# Patient Record
Sex: MALE | Race: WHITE | NOT HISPANIC OR LATINO | Employment: FULL TIME | URBAN - METROPOLITAN AREA
[De-identification: names, ages, dates, MRNs, and addresses within clinical notes are randomized per-mention and may not be internally consistent; named-entity substitution may affect disease eponyms.]

---

## 2018-11-30 DIAGNOSIS — Z00.00 ANNUAL PHYSICAL EXAM: Primary | ICD-10-CM

## 2018-11-30 DIAGNOSIS — Z13.220 NEED FOR LIPID SCREENING: ICD-10-CM

## 2018-12-06 ENCOUNTER — TELEPHONE (OUTPATIENT)
Dept: FAMILY MEDICINE CLINIC | Facility: CLINIC | Age: 38
End: 2018-12-06

## 2018-12-06 LAB
ALBUMIN SERPL-MCNC: 4.7 G/DL (ref 3.6–5.1)
ALBUMIN/GLOB SERPL: 1.7 (CALC) (ref 1–2.5)
ALP SERPL-CCNC: 58 U/L (ref 40–115)
ALT SERPL-CCNC: 14 U/L (ref 9–46)
AST SERPL-CCNC: 17 U/L (ref 10–40)
BILIRUB SERPL-MCNC: 1 MG/DL (ref 0.2–1.2)
BUN SERPL-MCNC: 16 MG/DL (ref 7–25)
BUN/CREAT SERPL: NORMAL (CALC) (ref 6–22)
CALCIUM SERPL-MCNC: 9.5 MG/DL (ref 8.6–10.3)
CHLORIDE SERPL-SCNC: 104 MMOL/L (ref 98–110)
CHOLEST SERPL-MCNC: 161 MG/DL
CHOLEST/HDLC SERPL: 3.8 (CALC)
CO2 SERPL-SCNC: 28 MMOL/L (ref 20–32)
CREAT SERPL-MCNC: 1.01 MG/DL (ref 0.6–1.35)
GLOBULIN SER CALC-MCNC: 2.8 G/DL (CALC) (ref 1.9–3.7)
GLUCOSE SERPL-MCNC: 93 MG/DL (ref 65–99)
HDLC SERPL-MCNC: 42 MG/DL
LDLC SERPL CALC-MCNC: 104 MG/DL (CALC)
NONHDLC SERPL-MCNC: 119 MG/DL (CALC)
POTASSIUM SERPL-SCNC: 4 MMOL/L (ref 3.5–5.3)
PROT SERPL-MCNC: 7.5 G/DL (ref 6.1–8.1)
SL AMB EGFR AFRICAN AMERICAN: 110 ML/MIN/1.73M2
SL AMB EGFR NON AFRICAN AMERICAN: 95 ML/MIN/1.73M2
SODIUM SERPL-SCNC: 138 MMOL/L (ref 135–146)
TRIGL SERPL-MCNC: 67 MG/DL

## 2018-12-06 NOTE — TELEPHONE ENCOUNTER
----- Message from Yanna Robison MD sent at 12/6/2018  6:47 AM EST -----  MM/LABS 12MOS    APPOINTMENT 12/11/18 @ [phone] ([id number]) w/Dr Karyle Reasoner

## 2018-12-11 ENCOUNTER — OFFICE VISIT (OUTPATIENT)
Dept: FAMILY MEDICINE CLINIC | Facility: CLINIC | Age: 38
End: 2018-12-11
Payer: COMMERCIAL

## 2018-12-11 VITALS
HEIGHT: 74 IN | OXYGEN SATURATION: 98 % | BODY MASS INDEX: 24 KG/M2 | HEART RATE: 73 BPM | DIASTOLIC BLOOD PRESSURE: 70 MMHG | WEIGHT: 187 LBS | SYSTOLIC BLOOD PRESSURE: 126 MMHG

## 2018-12-11 DIAGNOSIS — Z00.00 WELLNESS EXAMINATION: Primary | ICD-10-CM

## 2018-12-11 DIAGNOSIS — Z00.00 ANNUAL PHYSICAL EXAM: ICD-10-CM

## 2018-12-11 PROCEDURE — 99385 PREV VISIT NEW AGE 18-39: CPT | Performed by: FAMILY MEDICINE

## 2018-12-11 NOTE — PATIENT INSTRUCTIONS
F/u 1-3 yrs    Wellness Visit for Adults   AMBULATORY CARE:   A wellness visit  is when you see your healthcare provider to get screened for health problems  You can also get advice on how to stay healthy  Write down your questions so you remember to ask them  Ask your healthcare provider how often you should have a wellness visit  What happens at a wellness visit:  Your healthcare provider will ask about your health, and your family history of health problems  This includes high blood pressure, heart disease, and cancer  He or she will ask if you have symptoms that concern you, if you smoke, and about your mood  You may also be asked about your intake of medicines, supplements, food, and alcohol  Any of the following may be done:  · Your weight  will be checked  Your height may also be checked so your body mass index (BMI) can be calculated  Your BMI shows if you are at a healthy weight  · Your blood pressure  and heart rate will be checked  Your temperature may also be checked  · Blood and urine tests  may be done  Blood tests may be done to check your cholesterol levels  Abnormal cholesterol levels increase your risk for heart disease and stroke  You may also need a blood or urine test to check for diabetes if you are at increased risk  Urine tests may be done to look for signs of an infection or kidney disease  · A physical exam  includes checking your heartbeat and lungs with a stethoscope  Your healthcare provider may also check your skin to look for sun damage  · Screening tests  may be recommended  A screening test is done to check for diseases that may not cause symptoms  The screening tests you may need depend on your age, gender, family history, and lifestyle habits  For example, colorectal screening may be recommended if you are 48years old or older  Screening tests you need if you are a woman:   · A Pap smear  is used to screen for cervical cancer   Pap smears are usually done every 3 to 5 years depending on your age  You may need them more often if you have had abnormal Pap smear test results in the past  Ask your healthcare provider how often you should have a Pap smear  · A mammogram  is an x-ray of your breasts to screen for breast cancer  Experts recommend mammograms every 2 years starting at age 48 years  You may need a mammogram at age 52 years or younger if you have an increased risk for breast cancer  Talk to your healthcare provider about when you should start having mammograms and how often you need them  Vaccines you may need:   · Get an influenza vaccine  every year  The influenza vaccine protects you from the flu  Several types of viruses cause the flu  The viruses change over time, so new vaccines are made each year  · Get a tetanus-diphtheria (Td) booster vaccine  every 10 years  This vaccine protects you against tetanus and diphtheria  Tetanus is a severe infection that may cause painful muscle spasms and lockjaw  Diphtheria is a severe bacterial infection that causes a thick covering in the back of your mouth and throat  · Get a human papillomavirus (HPV) vaccine  if you are female and aged 23 to 32 or male 23 to 24 and never received it  This vaccine protects you from HPV infection  HPV is the most common infection spread by sexual contact  HPV may also cause vaginal, penile, and anal cancers  · Get a pneumococcal vaccine  if you are aged 72 years or older  The pneumococcal vaccine is an injection given to protect you from pneumococcal disease  Pneumococcal disease is an infection caused by pneumococcal bacteria  The infection may cause pneumonia, meningitis, or an ear infection  · Get a shingles vaccine  if you are aged 61 or older, even if you have had shingles before  The shingles vaccine is an injection to protect you from the varicella-zoster virus  This is the same virus that causes chickenpox   Shingles is a painful rash that develops in people who had chickenpox or have been exposed to the virus  How to eat healthy:  My Plate is a model for planning healthy meals  It shows the types and amounts of foods that should go on your plate  Fruits and vegetables make up about half of your plate, and grains and protein make up the other half  A serving of dairy is included on the side of your plate  The amount of calories and serving sizes you need depends on your age, gender, weight, and height  Examples of healthy foods are listed below:  · Eat a variety of vegetables  such as dark green, red, and orange vegetables  You can also include canned vegetables low in sodium (salt) and frozen vegetables without added butter or sauces  · Eat a variety of fresh fruits , canned fruit in 100% juice, frozen fruit, and dried fruit  · Include whole grains  At least half of the grains you eat should be whole grains  Examples include whole-wheat bread, wheat pasta, brown rice, and whole-grain cereals such as oatmeal     · Eat a variety of protein foods such as seafood (fish and shellfish), lean meat, and poultry without skin (turkey and chicken)  Examples of lean meats include pork leg, shoulder, or tenderloin, and beef round, sirloin, tenderloin, and extra lean ground beef  Other protein foods include eggs and egg substitutes, beans, peas, soy products, nuts, and seeds  · Choose low-fat dairy products such as skim or 1% milk or low-fat yogurt, cheese, and cottage cheese  · Limit unhealthy fats  such as butter, hard margarine, and shortening  Exercise:  Exercise at least 30 minutes per day on most days of the week  Some examples of exercise include walking, biking, dancing, and swimming  You can also fit in more physical activity by taking the stairs instead of the elevator or parking farther away from stores  Include muscle strengthening activities 2 days each week  Regular exercise provides many health benefits   It helps you manage your weight, and decreases your risk for type 2 diabetes, heart disease, stroke, and high blood pressure  Exercise can also help improve your mood  Ask your healthcare provider about the best exercise plan for you  General health and safety guidelines:   · Do not smoke  Nicotine and other chemicals in cigarettes and cigars can cause lung damage  Ask your healthcare provider for information if you currently smoke and need help to quit  E-cigarettes or smokeless tobacco still contain nicotine  Talk to your healthcare provider before you use these products  · Limit alcohol  A drink of alcohol is 12 ounces of beer, 5 ounces of wine, or 1½ ounces of liquor  · Lose weight, if needed  Being overweight increases your risk of certain health conditions  These include heart disease, high blood pressure, type 2 diabetes, and certain types of cancer  · Protect your skin  Do not sunbathe or use tanning beds  Use sunscreen with a SPF 15 or higher  Apply sunscreen at least 15 minutes before you go outside  Reapply sunscreen every 2 hours  Wear protective clothing, hats, and sunglasses when you are outside  · Drive safely  Always wear your seatbelt  Make sure everyone in your car wears a seatbelt  A seatbelt can save your life if you are in an accident  Do not use your cell phone when you are driving  This could distract you and cause an accident  Pull over if you need to make a call or send a text message  · Practice safe sex  Use latex condoms if are sexually active and have more than one partner  Your healthcare provider may recommend screening tests for sexually transmitted infections (STIs)  · Wear helmets, lifejackets, and protective gear  Always wear a helmet when you ride a bike or motorcycle, go skiing, or play sports that could cause a head injury  Wear protective equipment when you play sports  Wear a lifejacket when you are on a boat or doing water sports    © 2017 [employer] Information is for End User's use only and may not be sold, redistributed or otherwise used for commercial purposes  All illustrations and images included in CareNotes® are the copyrighted property of [employer]  or [employer]  The above information is an  only  It is not intended as medical advice for individual conditions or treatments  Talk to your doctor, nurse or pharmacist before following any medical regimen to see if it is safe and effective for you  F/u 1-3 yrs    Wellness Visit for Adults   AMBULATORY CARE:   A wellness visit  is when you see your healthcare provider to get screened for health problems  You can also get advice on how to stay healthy  Write down your questions so you remember to ask them  Ask your healthcare provider how often you should have a wellness visit  What happens at a wellness visit:  Your healthcare provider will ask about your health, and your family history of health problems  This includes high blood pressure, heart disease, and cancer  He or she will ask if you have symptoms that concern you, if you smoke, and about your mood  You may also be asked about your intake of medicines, supplements, food, and alcohol  Any of the following may be done:  · Your weight  will be checked  Your height may also be checked so your body mass index (BMI) can be calculated  Your BMI shows if you are at a healthy weight  · Your blood pressure  and heart rate will be checked  Your temperature may also be checked  · Blood and urine tests  may be done  Blood tests may be done to check your cholesterol levels  Abnormal cholesterol levels increase your risk for heart disease and stroke  You may also need a blood or urine test to check for diabetes if you are at increased risk  Urine tests may be done to look for signs of an infection or kidney disease  · A physical exam  includes checking your heartbeat and lungs with a stethoscope   Your healthcare provider may also check your skin to look for sun damage  · Screening tests  may be recommended  A screening test is done to check for diseases that may not cause symptoms  The screening tests you may need depend on your age, gender, family history, and lifestyle habits  For example, colorectal screening may be recommended if you are 48years old or older  Screening tests you need if you are a woman:   · A Pap smear  is used to screen for cervical cancer  Pap smears are usually done every 3 to 5 years depending on your age  You may need them more often if you have had abnormal Pap smear test results in the past  Ask your healthcare provider how often you should have a Pap smear  · A mammogram  is an x-ray of your breasts to screen for breast cancer  Experts recommend mammograms every 2 years starting at age 48 years  You may need a mammogram at age 52 years or younger if you have an increased risk for breast cancer  Talk to your healthcare provider about when you should start having mammograms and how often you need them  Vaccines you may need:   · Get an influenza vaccine  every year  The influenza vaccine protects you from the flu  Several types of viruses cause the flu  The viruses change over time, so new vaccines are made each year  · Get a tetanus-diphtheria (Td) booster vaccine  every 10 years  This vaccine protects you against tetanus and diphtheria  Tetanus is a severe infection that may cause painful muscle spasms and lockjaw  Diphtheria is a severe bacterial infection that causes a thick covering in the back of your mouth and throat  · Get a human papillomavirus (HPV) vaccine  if you are female and aged 23 to 32 or male 23 to 24 and never received it  This vaccine protects you from HPV infection  HPV is the most common infection spread by sexual contact  HPV may also cause vaginal, penile, and anal cancers  · Get a pneumococcal vaccine  if you are aged 72 years or older   The pneumococcal vaccine is an injection given to protect you from pneumococcal disease  Pneumococcal disease is an infection caused by pneumococcal bacteria  The infection may cause pneumonia, meningitis, or an ear infection  · Get a shingles vaccine  if you are aged 61 or older, even if you have had shingles before  The shingles vaccine is an injection to protect you from the varicella-zoster virus  This is the same virus that causes chickenpox  Shingles is a painful rash that develops in people who had chickenpox or have been exposed to the virus  How to eat healthy:  My Plate is a model for planning healthy meals  It shows the types and amounts of foods that should go on your plate  Fruits and vegetables make up about half of your plate, and grains and protein make up the other half  A serving of dairy is included on the side of your plate  The amount of calories and serving sizes you need depends on your age, gender, weight, and height  Examples of healthy foods are listed below:  · Eat a variety of vegetables  such as dark green, red, and orange vegetables  You can also include canned vegetables low in sodium (salt) and frozen vegetables without added butter or sauces  · Eat a variety of fresh fruits , canned fruit in 100% juice, frozen fruit, and dried fruit  · Include whole grains  At least half of the grains you eat should be whole grains  Examples include whole-wheat bread, wheat pasta, brown rice, and whole-grain cereals such as oatmeal     · Eat a variety of protein foods such as seafood (fish and shellfish), lean meat, and poultry without skin (turkey and chicken)  Examples of lean meats include pork leg, shoulder, or tenderloin, and beef round, sirloin, tenderloin, and extra lean ground beef  Other protein foods include eggs and egg substitutes, beans, peas, soy products, nuts, and seeds  · Choose low-fat dairy products such as skim or 1% milk or low-fat yogurt, cheese, and cottage cheese       · Limit unhealthy fats such as butter, hard margarine, and shortening  Exercise:  Exercise at least 30 minutes per day on most days of the week  Some examples of exercise include walking, biking, dancing, and swimming  You can also fit in more physical activity by taking the stairs instead of the elevator or parking farther away from stores  Include muscle strengthening activities 2 days each week  Regular exercise provides many health benefits  It helps you manage your weight, and decreases your risk for type 2 diabetes, heart disease, stroke, and high blood pressure  Exercise can also help improve your mood  Ask your healthcare provider about the best exercise plan for you  General health and safety guidelines:   · Do not smoke  Nicotine and other chemicals in cigarettes and cigars can cause lung damage  Ask your healthcare provider for information if you currently smoke and need help to quit  E-cigarettes or smokeless tobacco still contain nicotine  Talk to your healthcare provider before you use these products  · Limit alcohol  A drink of alcohol is 12 ounces of beer, 5 ounces of wine, or 1½ ounces of liquor  · Lose weight, if needed  Being overweight increases your risk of certain health conditions  These include heart disease, high blood pressure, type 2 diabetes, and certain types of cancer  · Protect your skin  Do not sunbathe or use tanning beds  Use sunscreen with a SPF 15 or higher  Apply sunscreen at least 15 minutes before you go outside  Reapply sunscreen every 2 hours  Wear protective clothing, hats, and sunglasses when you are outside  · Drive safely  Always wear your seatbelt  Make sure everyone in your car wears a seatbelt  A seatbelt can save your life if you are in an accident  Do not use your cell phone when you are driving  This could distract you and cause an accident  Pull over if you need to make a call or send a text message  · Practice safe sex    Use latex condoms if are sexually active and have more than one partner  Your healthcare provider may recommend screening tests for sexually transmitted infections (STIs)  · Wear helmets, lifejackets, and protective gear  Always wear a helmet when you ride a bike or motorcycle, go skiing, or play sports that could cause a head injury  Wear protective equipment when you play sports  Wear a lifejacket when you are on a boat or doing water sports  © 2017 [employer]  Information is for End User's use only and may not be sold, redistributed or otherwise used for commercial purposes  All illustrations and images included in CareNotes® are the copyrighted property of [employer]  or [employer]  The above information is an  only  It is not intended as medical advice for individual conditions or treatments  Talk to your doctor, nurse or pharmacist before following any medical regimen to see if it is safe and effective for you  Cholesterol and Your Health   AMBULATORY CARE:   Cholesterol  is a waxy, fat-like substance  Cholesterol is made by your body, but also comes from certain foods you eat  Your body uses cholesterol to make hormones and new cells  Your body also uses cholesterol to protect nerves  Cholesterol comes from foods such as meat and dairy products  Your total cholesterol level is made up by LDL cholesterol, HDL cholesterol, and triglycerides:  · LDL cholesterol  is called bad cholesterol  because it forms plaque in your arteries  As plaque builds up, your arteries become narrow, and less blood flows through  When plaque decreases blood flow to your heart, you may have chest pain  If plaque completely blocks an artery that bring blood to your heart, you may have a heart attack  Plaque can break off and form blood clots  Blood clots may block arteries in your brain and cause a stroke             · HDL cholesterol  is called good cholesterol  because it helps remove LDL cholesterol from your arteries  It does this by attaching to LDL cholesterol and carrying it to your liver  Your liver breaks down LDL cholesterol so your body can get rid of it  High levels of HDL cholesterol can help prevent a heart attack and stroke  Low levels of HDL cholesterol can increase your risk for heart disease, heart attack, and stroke  · Triglycerides  are a type of fat that store energy from foods you eat  High levels of triglycerides also cause plaque buildup  This can increase your risk for a heart attack or stroke  If your triglyceride level is high, your LDL cholesterol level may also be high  How food affects your cholesterol levels:   · Unhealthy fats  increase LDL cholesterol and triglyceride levels in your blood  They are found in foods high in cholesterol, saturated fat, and trans fat:     ¨ Cholesterol  is found in eggs, dairy, and meat  ¨ Saturated fat  is found in butter, cheese, ice cream, whole milk, and coconut oil  Saturated fat is also found in meat, such as sausage, hot dogs, and bologna  ¨ Trans fat  is found in liquid oils and is used in fried and baked foods  Foods that contain trans fats include chips, crackers, muffins, sweet rolls, microwave popcorn, and cookies  · Healthy fats,  also called unsaturated fats, help lower LDL cholesterol and triglyceride levels  Healthy fats include the following:     ¨ Monounsaturated fats  are found in foods such as olive oil, canola oil, avocado, nuts, and olives  ¨ Polyunsaturated fats,  such as omega 3 fats, are found in fish, such as salmon, trout, and tuna  They can also be found in plant foods such as flaxseed, walnuts, and soybeans  Other things that affect your cholesterol levels:   · Smoking cigarettes    · Being overweight or obese     · Drinking large amounts of alcohol    · Not enough exercise or no exercise    · Certain genes passed from your parents to you  What you need to know about having your cholesterol levels checked:   Adults 21to 39years of age should have their cholesterol levels checked every 4 to 6 years  Adults 45 years and older should have their cholesterol checked every 1 to 2 years  You may need your cholesterol checked more often, or at a younger age, if you have risk factors for heart disease  You may also need to have your cholesterol checked more often if you have other health conditions, such as diabetes  Blood tests are used to check cholesterol levels  Blood tests measure your levels of triglycerides, LDL cholesterol, and HDL cholesterol  Cholesterol level goals: Your cholesterol level goal may depend on your risk for heart disease  It may also depend on your age and other health conditions  Ask your healthcare provider if the following goals are right for you:  · Your total cholesterol level  should be less than 200 mg/dL  This number may also depend on your HDL and LDL cholesterol goals  · Your LDL cholesterol level  should be less than 130 mg/dL  · Your HDL cholesterol level  should be 60 mg/dL or higher  · Your triglyceride level  should be less than 150 mg/dL  Treatment for high cholesterol:  Treatment for high cholesterol will also decrease your risk of heart disease, heart attack, and stroke  Treatment may include any of the following:  · Medicines  may be given to lower your LDL cholesterol, triglyceride levels, or total cholesterol level  You may need medicines to lower your cholesterol if any of the following is true:     ¨ You have a history of stroke, TIA, unstable angina, or a heart attack    ¨ Your LDL cholesterol level is 190 mg/dL or higher    ¨ You are age 36to 76years of age, have diabetes, and your LDL cholesterol is 70 mg/dL or higher    ¨ You are age 36to 76years of age, have risk factors for heart disease, and your LDL cholesterol is 70 mg/dL or higher    · Lifestyle changes  include changes to your diet, exercise, weight loss, and quitting smoking   It also includes decreasing the amount of alcohol you drink  · Supplements  include fish oil, red yeast rice, and garlic  Fish oil may help lower your triglyceride and LDL cholesterol levels  It may also increase your HDL cholesterol level  Red yeast rice may help decrease your total cholesterol level and LDL cholesterol level  Garlic may help lower your total cholesterol level  Do not take these supplements without talking to your healthcare provider  Nutrition to help lower your cholesterol levels:  A registered dietitian can help you create a healthy eating plan  Read food labels and choose foods low in saturated fat, trans fats, and cholesterol  · Decrease the total amount of fat you eat  Choose lean meats, fat-free or 1% fat milk, and low-fat dairy products, such as yogurt and cheese  Try to limit or avoid red meats  Limit or do not eat fried foods or baked goods such as cookies  · Replace unhealthy fats with healthy fats  Cook foods in olive oil or canola oil  Choose soft margarines that are low in saturated fat and trans fat  Seeds, nuts, and avocados are other examples of healthy fats  · Eat foods with omega-3 fats  Examples include salmon, tuna, mackerel, walnuts, and flaxseed  Eat fish 2 times per week  Children and pregnant women should not eat fish that have high levels of mercury, such as shark, swordfish, and nabil mackerel  · Increase the amount of plant-based foods you eat  Plant-based foods are low in cholesterol and fat  Eating more of these foods may help lower your cholesterol and help you lose weight  Examples of plant-based foods includes fruits, vegetables, legumes, and whole grains  Replace milk that contains dairy with almond, soy, or coconut milk  Eat beans and foods with soy for protein instead of meat  Ask your healthcare provider or dietitian for more information on plant-based foods  · Increase the amount of fiber you eat  High-fiber foods can help lower your LDL cholesterol   You should eat between 20 and 30 grams of fiber each day  Eat at least 5 servings of fruits and vegetables each day  Other examples of high-fiber foods include whole-grain or whole-wheat breads, pastas, or cereals, and brown rice  Eat 3 ounces of whole-grain foods each day  Increase fiber slowly  You may have abdominal discomfort, bloating, and gas if you add fiber to your diet too quickly  Lifestyle changes you can make to help lower your cholesterol levels:   · Maintain a healthy weight  Ask your healthcare provider how much you should weigh  Ask him or her to help you create a weight loss plan if you are overweight  Weight loss can decrease your total cholesterol and triglyceride levels  · Exercise regularly  Exercise can help lower your total cholesterol level and maintain a healthy weight  Exercise can also help increase your HDL cholesterol level  Work with your healthcare provider to create an exercise program that is right for you  Get at least 30 minutes of moderate exercise most days of the week  Examples of exercise include brisk walking, swimming, or biking  · Do not smoke  Nicotine and other chemicals in cigarettes and cigars can damage your lungs, heart, and blood vessels  They can also raise your triglyceride levels  Ask your healthcare provider for information if you currently smoke and need help to quit  E-cigarettes or smokeless tobacco still contain nicotine  Talk to your healthcare provider before you use these products  · Limit or do not drink alcohol  Alcohol can increase your triglyceride levels  Ask your healthcare provider if it is safe for you to drink alcohol  Also ask how much is safe for you to drink each day  © 2017 [address] Information is for End User's use only and may not be sold, redistributed or otherwise used for commercial purposes   All illustrations and images included in CareNotes® are the copyrighted property of A D A M , Inc  or [employer]  The above information is an  only  It is not intended as medical advice for individual conditions or treatments  Talk to your doctor, nurse or pharmacist before following any medical regimen to see if it is safe and effective for you

## 2018-12-11 NOTE — PROGRESS NOTES
ADULT ANNUAL PHYSICAL  [facility] - [address]    NAME: Clint Patel  AGE: 41 y o  SEX: male  : 1980     DATE: 2018     Assessment and Plan:     Problem List Items Addressed This Visit     None      Visit Diagnoses     Wellness examination    -  Primary    Annual physical exam              Health maintenance and preventative care screenings were discussed with patient today  Appropriate education was printed on patient's after visit summary  · Discussed risks/benefits of screening for HIV  Patient is up-to-date with their preventive screenings  · Immunizations were reviewed: patient is up-to-date with his immunizations  Counseling:  · Dental Health: discussed importance of regular tooth brushing, flossing, and dental visits  No Follow-up on file  Chief Complaint:     Chief Complaint   Patient presents with    New PT     est care--review labs      History of Present Illness:     Adult Annual Physical   Patient here for a comprehensive physical exam  The patient reports no problems  Diet and Physical Activity  · Diet/Nutrition: well balanced diet  · Weight concerns: none, patient's BMI is between 18 5-24 9  · Exercise: moderate cardiovascular exercise  Depression Screening  PHQ-9 Depression Screening    PHQ-9:    Frequency of the following problems over the past two weeks:            General Health  · Sleep: sleeps well  · Hearing: normal - bilateral   · Vision: normal  · Dental: regular dental visits   Health  · History of STDs?: no   · Erectile dysfunction: no        Review of Systems:     Review of Systems   Constitutional: Negative for fatigue, fever and unexpected weight change  HENT: Negative for congestion, sinus pain and sore throat  Eyes: Negative for visual disturbance  Respiratory: Negative for shortness of breath and wheezing  Cardiovascular: Negative for chest pain and palpitations  Gastrointestinal: Negative for abdominal pain, nausea and vomiting  Musculoskeletal: Negative  Negative for arthralgias and myalgias  Neurological: Negative for syncope, weakness and numbness  Psychiatric/Behavioral: Negative  Negative for confusion, dysphoric mood and suicidal ideas  Past Medical History:     No past medical history on file  Past Surgical History:     No past surgical history on file  Social History:     Social History     Social History    Marital status: /Civil Union     Spouse name: N/A    Number of children: N/A    Years of education: N/A     Social History Main Topics    Smoking status: Never Smoker    Smokeless tobacco: Never Used    Alcohol use Yes    Drug use: No    Sexual activity: Not on file     Other Topics Concern    Not on file     Social History Narrative    No narrative on file      Family History:     Family History   Problem Relation Age of Onset    Hypertension Mother     Lupus Paternal Grandfather       Current Medications:     No current outpatient prescriptions on file  No current facility-administered medications for this visit  Allergies:     No Known Allergies   Objective:     /70   Pulse 73   Ht 6' 2" (1 88 m)   Wt 84 8 kg (187 lb)   SpO2 98%   BMI 24 01 kg/m²     Physical Exam   Neck: Normal range of motion  Neck supple  Cardiovascular: Normal rate and regular rhythm  Pulmonary/Chest: Effort normal and breath sounds normal    Abdominal: Bowel sounds are normal    Musculoskeletal: Normal range of motion  Health Maintenance: There are no preventive care reminders to display for this patient    Immunization History   Administered Date(s) Administered    Influenza 10/15/2018    Meningococcal Polysaccharide (MPSV4) 08/12/1999       Joyce Alvarez MD  [address]

## 2020-02-14 ENCOUNTER — TRANSCRIBE ORDERS (OUTPATIENT)
Dept: SCHEDULING | Age: 40
End: 2020-02-14

## 2020-02-14 DIAGNOSIS — R51.9 HEADACHE: Primary | ICD-10-CM

## 2020-02-21 ENCOUNTER — HOSPITAL ENCOUNTER (OUTPATIENT)
Dept: RADIOLOGY | Age: 40
Discharge: HOME | End: 2020-02-21
Attending: PSYCHIATRY & NEUROLOGY
Payer: COMMERCIAL

## 2020-02-21 DIAGNOSIS — R51.9 HEADACHE: ICD-10-CM

## 2020-02-21 RX ORDER — GADOBUTROL 604.72 MG/ML
8.5 INJECTION INTRAVENOUS ONCE
Status: COMPLETED | OUTPATIENT
Start: 2020-02-21 | End: 2020-02-21

## 2020-02-21 RX ADMIN — GADOBUTROL 8.5 ML: 604.72 INJECTION INTRAVENOUS at 08:32

## 2020-11-11 ENCOUNTER — TELEPHONE (OUTPATIENT)
Dept: GENETICS | Facility: HOSPITAL | Age: 40
End: 2020-11-11

## 2020-11-11 NOTE — TELEPHONE ENCOUNTER
Mr. Haris Mcintosh called to inquire about scheduling a genetic counseling appointment. He wanted to schedule an appointment as his father had tested positive for a mutation in CHEK2. He was told he as 90 days to test in order for the testing to be covered. I gave him a brief overview of what to expect at an appointment and explained the process for testing. I also reviewed insurance coverage for the appointment. He was interested in scheduling an appointment, I transferred him to general scheduling.

## 2020-11-24 ENCOUNTER — TELEMEDICINE (OUTPATIENT)
Dept: GENETICS | Facility: HOSPITAL | Age: 40
End: 2020-11-24
Payer: COMMERCIAL

## 2020-11-24 DIAGNOSIS — Z80.3 FHX: BREAST CANCER: ICD-10-CM

## 2020-11-24 DIAGNOSIS — Z80.41 FHX: OVARIAN CANCER: ICD-10-CM

## 2020-11-24 DIAGNOSIS — Z71.83 ENCOUNTER FOR NONPROCREATIVE GENETIC COUNSELING: Primary | ICD-10-CM

## 2020-11-24 PROCEDURE — 96040 HC GENETICS COUNSELING SESSIONS-TELEHEALTH: CPT | Performed by: GENETIC COUNSELOR, MS

## 2020-11-24 NOTE — PROGRESS NOTES
Patient Name:  Haris Mcintosh  : 1980       Telemedicine Consent:    Prior to commencing the session, Haris Mcintosh provided verbal consent to have genetic counseling via telemedicine using [employer], which is a telemedicine platform being utilized to provide care during the COVID-19 pandemic. Haris Mcintosh understands the session will be billed to insurance or to them directly if uninsured or if not covered by insurance. Haris Mcintosh was informed that the clinician is the only provider on?the video conference, sessions are not recorded by the clinician, and the patient is not permitted to record the session.? The clinician confirmed identification of patient by name and birthdate, confirmed patient location, support person(s) present, and obtained a callback number in case disconnected.     Indication for Appointment:  Haris Mcintosh presented for genetic counseling and cancer risk assessment via a telemedicine encounter due to a family history of breast and ovarian cancer and family history of known gene mutation in CHEK2 called c.470T>C (p.I157T). Haris Mcintosh was self-referred and presented to the session alone.    Personal History:   Haris Mcintosh is 40 y.o. male of [place], [place] and [place] descent with primary visit diagnosis of Encounter for nonprocreative genetic counseling [Z71.83].    Past Medical History:   Diagnosis Date   • Anterior uveitis    • HLA B27 (HLA B27 positive)       Past Medical History Pertinent Negatives:   Denies History Of: Date Noted   • Disease of thyroid gland 2020   • Melanoma (CMS/HCC) 2020     Past Surgical History:   Procedure Laterality Date   • Upper gastrointestinal endoscopy  10/2020    clear per patient, due to GERD     Past Surgical History Pertinent Negatives:   Denies History Of: Date Noted   • Colonoscopy 2020        Social History     Tobacco Use   • Smoking status: Never Smoker   • Smokeless tobacco: Never Used   Substance Use Topics   •  Alcohol use: Yes     Alcohol/week: 0.0 - 1.0 standard drinks   • Drug use: None       Family History:  See completed family history in pedigree below.  Of note, Haris Mcintosh's paternal aunt with breast cancer underwent genetic testing due to her recent diagnosis of breast cancer and family history of breast, ovarian and uterine cancer.  A 25 gene panel through [facility] was ordered and a moderate risk mutation in the gene CHEK2 called c.470T>C (p.I157T) and a variant of uncertain significance in the gene BRCA2 called p.Q0031V were identified ([facility] [id number]).  Haris Mcintosh's father underwent a 36 gene panel through [facility] and a moderate risk mutation was identified in the gene CHEK2 called c.470T>C ([facility] [id number]).      Genetic Education/Risk Assessment/Counseling:  Information was provided about the relationship between genes and cancer.  The concept of hereditary cancer was defined.  Natural history, risks and inheritance patterns of  cancer-associated genes were reviewed, as related to Haris Mcintosh’s personal and/or family history.  Related psychosocial aspects were discussed.    Discussion of Genetic Testing:  The pros, cons, and limitations of testing for genetic susceptibility were discussed, including but not limited to test options, possible results, potential impact on management, and psychosocial aspects.  There may be limited data on the degree of cancer risk and/or no defined management guidelines associated with some genes.  If applicable, risk assessment models and/or published tables were used to provide a mutation probability estimate. Limitations of assessment were reviewed.    Given the reported personal and/or family history, genetic testing was offered and accepted. The following testing was ordered:    [facility]  Single Site Test for CHEK2 mutation called c.470T>C (p.I157T)    Plan:  Haris Mcintosh was confirmed to have understood the aforementioned information and was assisted  with decision making as needed.  Informational and supportive resources were provided. Consent was obtained to share chart note(s) with physicians. Haris Mcintosh is encouraged to contact the program with personal/family history updates. Haris Mcintosh will be contacted via telephone when genetic test results are available.     A total of 30 minutes was spent providing genetic counseling to Haris Mcintosh.

## 2020-11-24 NOTE — LETTER
11/24/20    Benjamin Nolan MD  [address] [place] [address]      Dear Dr. Nolan,    I am writing to confirm that your patient, Haris Mcintosh, received care through my office on 11/24/20. I have enclosed a summary of the care provided to Haris for your reference.    Please contact me with any questions you may have regarding the visit.    Sincerely,           TIM Sutton      CC: No Recipients

## 2020-12-17 ENCOUNTER — TELEPHONE (OUTPATIENT)
Dept: GENETICS | Age: 40
End: 2020-12-17

## 2020-12-17 NOTE — LETTER
12/30/20    Haris Mcintosh  [address] [facility] [place] [address]    Dear Mr. Mcintosh,    Thank you for participating in the [facility].  It was a pleasure working with you. Attached is documentation from our discussion(s). It will also be sent to any physicians you indicated.      You may also choose to share this documentation with your family members. Because cancer risk is based on both personal and both maternal and paternal family history factors, as well as mutation status, your relatives are recommended to review their risks and genetic testing options (if applicable) with their own healthcare providers to derive a risk-appropriate, individualized plan.      If you have any questions, concerns, or updates to your personal/family history, please contact the [facility] Genetics Program at [phone] to further review your case.    Please see below for your encounter report.    Sincerely,         Vilma Duncan, [facility]        Encounter Note       Indication for Appointment:  Haris Mcintosh was referred to the Cancer Risk Assessment and Genetics Program via a telemedicine encounter due to family history of breast and ovarian cancer and family history of known gene mutation in CHEK2 called c.470T>C (p.I157T). Genetic testing was performed.    Haris Mcintosh was contacted by telephone today to discuss genetic test results, risk-based management guidelines and any potential additional test options. Follow up appointments to discuss the results in more detail with our medical director may be scheduled by contacting the Cancer Risk Assessment and Genetics Program.    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  This is referred to as a positive result and is associated with inherited cancer risk. Of note, there is discordance in classification among genetic testing laboratories regarding this particular CHEK2 mutation ranging from a variant of uncertain significance to a pathogenic/likely pathogenic (i.e. increased cancer risk). [employer] classifies this mutation as deleterious, thus this mutation is considered to be associated with an increased cancer risk as outlined below. Due to differences in variant classification for this result, family members are strongly recommended to discuss this information with a certified genetic counselor. Laboratory selection based on classification may be warranted.     CHEK2 Carrier   CHEK2 is a gene that is important in cell regulation and control of cell growth. Individuals all have two copies of the CHEK2 gene, one from each of their parents. When one of those copies is not functioning, it is said to have a “pathogenic” or “deleterious” mutation. Individuals with pathogenic mutations in ONE copy of the CHEK2 gene are referred to as “heterozygous” mutation carriers.    CHEK2 mutations are inherited in a dominant manner.  First-degree relatives (parents, siblings and children) each have a 50% chance of having the same mutation.    Lifetime Risks* Associated with CHEK2 Mutations   Cancer Type CHEK2  Carrier Risk General Population  Cancer Risk   Female Breast Cancer Up to 40%  12.3%   2nd Primary Female Breast Cancer   Up to 29% within first 10 years Up to 15%   Colorectal ~ 12% Up to 6%     *Knowledge regarding the lifetime cancer risks and the clinical spectrum of cancers associated with CHEK2 mutations is evolving.  Additionally, risk may vary with specific mutation and/or with family cancer history.  As data continues to emerge and advancements in technology are made, there  remains a chance of reclassification of any mutation detected.     Personal History:   Haris Mcintosh is 40 y.o. male of Polish, [place]ian and [place]ian descent.      Past Medical History:   Diagnosis Date   • Anterior uveitis    • HLA B27 (HLA B27 positive)      Past Medical History Pertinent Negatives:   Denies History Of: Date Noted   • Disease of thyroid gland 11/24/2020   • Melanoma (CMS/HCC) 11/24/2020     Past Surgical History:   Procedure Laterality Date   • Upper gastrointestinal endoscopy  10/2020    clear per patient, due to GERD     Past Surgical History Pertinent Negatives:   Denies History Of: Date Noted   • Colonoscopy 11/24/2020           Social History     Tobacco Use   • Smoking status: Never Smoker   • Smokeless tobacco: Never Used   Substance Use Topics   • Alcohol use: Yes     Alcohol/week: 0.0 - 1.0 standard drinks   • Drug use: Not on file       Family History:  See completed family history in pedigree below.  Of note, Haris Mcintosh's paternal aunt with breast cancer underwent genetic testing due to her recent diagnosis of breast cancer and family history of breast, ovarian and uterine cancer.  A 25 gene panel through [facility] was ordered and a moderate risk mutation in the gene CHEK2 called c.470T>C (p.I157T) and a variant of uncertain significance in the gene BRCA2 called p.Z3079M were identified ([facility] [id number]).  Haris Mcintsoh's father underwent a 36 gene panel through [facility] and a moderate risk mutation was identified in the gene CHEK2 called c.470T>C ([facility] [id number]).        Risk Assessment and Management:  Cancer risks are based on Haris Mcintosh's personal and family cancer history and the mutation status.  Available screening guidelines for mutation carriers and family members who have not yet had testing to confirm the presence or absence of the mutation in themselves were reviewed and should be tailored by managing physicians. The efficacy of screening for many cancers remains  unclear or under investigation; as guidelines continually change, regular review of personal and family history with a physician is recommended. Education regarding signs and symptoms of cancer(s), especially those associated with CHEK2 gene mutations, is encouraged.    Risk Management Guidelines for CHEK2 carriers:  National Comprehensive Cancer Network (NCCN Breast, Ovarian, Pancreas v2.2021 and Colorectal v1.2020)-based guidelines for confirmed CHEK2 mutation carriers are currently limited to breast screening and risk reduction in women and colorectal screening in men and women. Additional cancer screening for CHEK2 carriers may be warranted based on personal and/or family history or other clinical factors and should be modified on a case by case basis.     Screening:  Breast- Female  • Breast awareness (including self-breast examination) at age 18  • Clinical breast exam every 6-12 months starting at age 25  • Annual breast mammogram with consideration of tomosynthesis (3D digital imaging) starting by age 40, or 5 to 10 years younger than the earliest documented breast cancer diagnosis in the family but not later than age 40  • Consideration of annual breast MRI with contrast starting at age 40 years, or younger based on family history  • Alternating, annual mammogram and annual breast MRI every 6 months is encouraged for women undergoing screening  • For female mutation carriers who have been treated for breast cancer, and who have not had bilateral mastectomy, screening of the remaining breast tissue should continue as outlined above.     Colorectal - Male and Female  • Colonoscopy beginning at age 40, or 10 years prior to the youngest diagnosis of colorectal cancer in the family, recommended to repeat every 5 years.    Risk Reduction:  Breast- Female  • At this time, there is insufficient evidence to recommend risk-reducing bilateral mastectomy based on mutation status. This may be discussed based on family  history and other clinical factors.   • As the lifetime risk for breast cancer exceeds 20%, discussion regarding the risk and benefits of risk reduction agents with selective estrogen receptor modulators (SERMs) (i.e. Tamoxifen, Raloxifene) could be discussed with managing physician(s).  Of note, these agents have not been exclusively studied in CHEK2 carriers.    Other Relevant Information for CHEK2 Carriers   • Education regarding signs and symptoms of CHEK2-associated cancer(s) is encouraged.      • In families with confirmed deleterious/pathogenic or mutation, cancer risks in those who test negative may still be somewhat higher than average, due to the impact of modifying factors also shared by family members. Confirming the lineage of the mutation, if not already known, is recommended.  Because cancer risk is based on both personal and both maternal and paternal family history factors, as well as mutation status, other relatives are encouraged to consider risk assessment and/or genetic evaluation to derive a risk-appropriate, individualized plan.  Should family member(s) be interested in genetic evaluation, the Cancer Risk Assessment and Genetics Program can provide consultation or help to find a genetic counselor in their area.    • For individuals of childbearing age, options for pre-treva diagnosis and assisted reproduction exist for family members who are found to harbor the mutation in the childbearing age.      • Studies regarding cancer risk and management for CHEK2 carriers remain underway. Individuals with a CHEK2 mutation are encouraged to consider participating in research registries and/or clinical trials. Participation in the PROMPT (Prospective Registry of Multiplex Testing) registry at www.promptstudy.org, a joint online registry involving several academic institutions and laboratories who are working together in an effort to learn more about risk associated with mutations identified is  encouraged.    Additional Cancer Risk Management   Screening for cancers not confirmed to be part of the CHEK2 spectrum remains based on personal/family history. Screening for many cancers remains unproven or under investigation; as guidelines continually change, regular review of personal and family history with a physician is recommended.      Prostate Management:  Haris Mcintosh is suggested to review current prostate cancer screening recommendations with his treating physician.  Current NCCN guidelines suggest discussion about risk and benefits of prostate cancer screening with baseline serum PSA with consideration of baseline digital rectal examination beginning at age 45.  Interval screening thereafter is determined by findings.    Dermatologic Management:  Haris Mcintosh is encouraged to practice skin protective behaviors, such as limiting direct sun exposure, using sunscreen, and pursuing annual skin screening by a physician is recommended.    General Management:  - Annual physical examination is encouraged.  - Adherence to a healthy lifestyle, including body mass index (BMI) <25 obtained through balanced diet and exercise, limiting intake of alcoholic beverages to less than 1 drink per day (serving equals 1 ounce of liquor, 6 ounces of wine or 8 ounces of beer) and not smoking.    Plan:  The information provided reflects current practice guidelines and may change with new medical discoveries/technology/updated personal or family history information.  Haris Mcintosh was confirmed to have understood the aforementioned information and was assisted with decision making as needed.  Informational and supportive resources were provided.  Potential psychosocial ramifications related to test results were reviewed.  Consent was obtained to share chart note(s) with physicians.  Haris Mcintosh plans to discuss the above information with physicians to determine an optimal risk management plan.    Haris Mcintosh should  contact the program with personal/family history updates as this could alter the guidelines provided and/or available test options and/or to inquire about new information specific to this case.   As stated, there may be other genes associated with cancer risk for which Haris Mcintosh was not tested.  Haris Mcintosh was encouraged to contact the genetics program at [phone] with any questions or concerns and/or to periodically review test options and related insurance coverage.

## 2020-12-17 NOTE — LETTER
12/30/20    Benjamin Nolan MD  [address] [place] [address]      Dear Dr. Nolan,    I am writing to confirm that your patient, Haris Mcintosh, received care through my office on 12/30/20. I have enclosed a summary of the care provided to Haris for your reference.    Please contact me with any questions you may have regarding the visit.    Sincerely,           TIM Sutton      CC: No Recipients

## 2020-12-30 NOTE — TELEPHONE ENCOUNTER
Patient Name:  Haris Mcintosh  : 1980       Indication for Appointment:  Haris Mcintosh was referred to the Cancer Risk Assessment [facility]etics Program via a telemedicine encounter due to family history of breast and ovarian cancer and family history of known gene mutation in CHEK2 called c.470T>C (p.I157T). Genetic testing was performed.    Haris Mcintosh was contacted by telephone today to discuss genetic test results, risk-based management guidelines and any potential additional test options. Follow up appointments to discuss the results in more detail with our medical director may be scheduled by contacting the Cancer Risk Assessment and Genetics Program.    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  This is referred to as a positive result and is associated with inherited cancer risk. Of note, there is discordance in classification among genetic testing laboratories regarding this particular CHEK2 mutation ranging from a variant of uncertain significance to a pathogenic/likely pathogenic (i.e. increased cancer risk). [facility] classifies this mutation as deleterious, thus this mutation is considered to be associated with an increased cancer risk as outlined below. Due to differences in variant classification for this result, family members are strongly recommended to discuss this information with a certified genetic counselor. Laboratory selection based on classification may be warranted.     CHEK2 Carrier   CHEK2 is a gene that is important in cell regulation and control of cell growth. Individuals all have two copies of the CHEK2 gene, one from each of their parents.  When one of those copies is not functioning, it is said to have a “pathogenic” or “deleterious” mutation. Individuals with pathogenic mutations in ONE copy of the CHEK2 gene are referred to as “heterozygous” mutation carriers.    CHEK2 mutations are inherited in a dominant manner.  First-degree relatives (parents, siblings and children) each have a 50% chance of having the same mutation.    Lifetime Risks* Associated with CHEK2 Mutations   Cancer Type CHEK2  Carrier Risk General Population  Cancer Risk   Female Breast Cancer Up to 40%  12.3%   2nd Primary Female Breast Cancer   Up to 29% within first 10 years Up to 15%   Colorectal ~ 12% Up to 6%     *Knowledge regarding the lifetime cancer risks and the clinical spectrum of cancers associated with CHEK2 mutations is evolving.  Additionally, risk may vary with specific mutation and/or with family cancer history.  As data continues to emerge and advancements in technology are made, there remains a chance of reclassification of any mutation detected.     Personal History:   Haris Mcintosh is 40 y.o. male of [place], [place] and [place] descent.      Past Medical History:   Diagnosis Date   • Anterior uveitis    • HLA B27 (HLA B27 positive)      Past Medical History Pertinent Negatives:   Denies History Of: Date Noted   • Disease of thyroid gland 11/24/2020   • Melanoma (CMS/HCC) 11/24/2020     Past Surgical History:   Procedure Laterality Date   • Upper gastrointestinal endoscopy  10/2020    clear per patient, due to GERD     Past Surgical History Pertinent Negatives:   Denies History Of: Date Noted   • Colonoscopy 11/24/2020           Social History     Tobacco Use   • Smoking status: Never Smoker   • Smokeless tobacco: Never Used   Substance Use Topics   • Alcohol use: Yes     Alcohol/week: 0.0 - 1.0 standard drinks   • Drug use: Not on file       Family History:  See completed family history in pedigree below.  Of note, Haris Mcintosh's paternal aunt with breast  cancer underwent genetic testing due to her recent diagnosis of breast cancer and family history of breast, ovarian and uterine cancer.  A 25 gene panel through [facility] was ordered and a moderate risk mutation in the gene CHEK2 called c.470T>C (p.I157T) and a variant of uncertain significance in the gene BRCA2 called p.C9605C were identified ([facility] [id number]).  Haris Mcintosh's father underwent a 36 gene panel through [facility] and a moderate risk mutation was identified in the gene CHEK2 called c.470T>C ([facility] [id number]).        Risk Assessment and Management:  Cancer risks are based on Haris Mcintosh's personal and family cancer history and the mutation status.  Available screening guidelines for mutation carriers and family members who have not yet had testing to confirm the presence or absence of the mutation in themselves were reviewed and should be tailored by managing physicians. The efficacy of screening for many cancers remains unclear or under investigation; as guidelines continually change, regular review of personal and family history with a physician is recommended. Education regarding signs and symptoms of cancer(s), especially those associated with CHEK2 gene mutations, is encouraged.    Risk Management Guidelines for CHEK2 carriers:  National Comprehensive Cancer Network (NCCN Breast, Ovarian, Pancreas v2.2021 and Colorectal v1.2020)-based guidelines for confirmed CHEK2 mutation carriers are currently limited to breast screening and risk reduction in women and colorectal screening in men and women. Additional cancer screening for CHEK2 carriers may be warranted based on personal and/or family history or other clinical factors and should be modified on a case by case basis.     Screening:  Breast- Female  • Breast awareness (including self-breast examination) at age 18  • Clinical breast exam every 6-12 months starting at age 25  • Annual breast mammogram with consideration of tomosynthesis (3D  digital imaging) starting by age 40, or 5 to 10 years younger than the earliest documented breast cancer diagnosis in the family but not later than age 40  • Consideration of annual breast MRI with contrast starting at age 40 years, or younger based on family history  • Alternating, annual mammogram and annual breast MRI every 6 months is encouraged for women undergoing screening  • For female mutation carriers who have been treated for breast cancer, and who have not had bilateral mastectomy, screening of the remaining breast tissue should continue as outlined above.     Colorectal - Male and Female  • Colonoscopy beginning at age 40, or 10 years prior to the youngest diagnosis of colorectal cancer in the family, recommended to repeat every 5 years.    Risk Reduction:  Breast- Female  • At this time, there is insufficient evidence to recommend risk-reducing bilateral mastectomy based on mutation status. This may be discussed based on family history and other clinical factors.   • As the lifetime risk for breast cancer exceeds 20%, discussion regarding the risk and benefits of risk reduction agents with selective estrogen receptor modulators (SERMs) (i.e. Tamoxifen, Raloxifene) could be discussed with managing physician(s).  Of note, these agents have not been exclusively studied in CHEK2 carriers.    Other Relevant Information for CHEK2 Carriers   • Education regarding signs and symptoms of CHEK2-associated cancer(s) is encouraged.      • In families with confirmed deleterious/pathogenic or mutation, cancer risks in those who test negative may still be somewhat higher than average, due to the impact of modifying factors also shared by family members. Confirming the lineage of the mutation, if not already known, is recommended.  Because cancer risk is based on both personal and both maternal and paternal family history factors, as well as mutation status, other relatives are encouraged to consider risk assessment  and/or genetic evaluation to derive a risk-appropriate, individualized plan.  Should family member(s) be interested in genetic evaluation, the Cancer Risk Assessment and Genetics Program can provide consultation or help to find a genetic counselor in their area.    • For individuals of childbearing age, options for pre- diagnosis and assisted reproduction exist for family members who are found to harbor the mutation in the childbearing age.      • Studies regarding cancer risk and management for CHEK2 carriers remain underway. Individuals with a CHEK2 mutation are encouraged to consider participating in research registries and/or clinical trials. Participation in the PROMPT (Prospective Registry of Multiplex Testing) registry at www.promptstudy.org, a joint online registry involving several academic institutions and laboratories who are working together in an effort to learn more about risk associated with mutations identified is encouraged.    Additional Cancer Risk Management   Screening for cancers not confirmed to be part of the CHEK2 spectrum remains based on personal/family history. Screening for many cancers remains unproven or under investigation; as guidelines continually change, regular review of personal and family history with a physician is recommended.      Prostate Management:  Haris Arnaldo is suggested to review current prostate cancer screening recommendations with his treating physician.  Current NCCN guidelines suggest discussion about risk and benefits of prostate cancer screening with baseline serum PSA with consideration of baseline digital rectal examination beginning at age 45.  Interval screening thereafter is determined by findings.    Dermatologic Management:  Haris Mcintosh is encouraged to practice skin protective behaviors, such as limiting direct sun exposure, using sunscreen, and pursuing annual skin screening by a physician is recommended.    General Management:  - Annual  physical examination is encouraged.  - Adherence to a healthy lifestyle, including body mass index (BMI) <25 obtained through balanced diet and exercise, limiting intake of alcoholic beverages to less than 1 drink per day (serving equals 1 ounce of liquor, 6 ounces of wine or 8 ounces of beer) and not smoking.    Plan:  The information provided reflects current practice guidelines and may change with new medical discoveries/technology/updated personal or family history information.  Haris Mcintosh was confirmed to have understood the aforementioned information and was assisted with decision making as needed.  Informational and supportive resources were provided.  Potential psychosocial ramifications related to test results were reviewed.  Consent was obtained to share chart note(s) with physicians.  Haris Mcintosh plans to discuss the above information with physicians to determine an optimal risk management plan.    Haris Mcintosh should contact the program with personal/family history updates as this could alter the guidelines provided and/or available test options and/or to inquire about new information specific to this case.   As stated, there may be other genes associated with cancer risk for which Haris Mcintosh was not tested.  Haris Mcintosh was encouraged to contact the genetics program at [phone] with any questions or concerns and/or to periodically review test options and related insurance coverage.

## 2021-04-01 DIAGNOSIS — Z23 ENCOUNTER FOR IMMUNIZATION: ICD-10-CM

## 2022-11-17 ENCOUNTER — TRANSCRIBE ORDERS (OUTPATIENT)
Dept: SCHEDULING | Age: 42
End: 2022-11-17

## 2022-11-17 DIAGNOSIS — R27.0 ATAXIA, UNSPECIFIED: ICD-10-CM

## 2022-11-17 DIAGNOSIS — H81.319 AURAL VERTIGO, UNSPECIFIED EAR: Primary | ICD-10-CM

## 2022-11-17 DIAGNOSIS — H53.9 UNSPECIFIED VISUAL DISTURBANCE: ICD-10-CM

## 2022-11-26 ENCOUNTER — HOSPITAL ENCOUNTER (OUTPATIENT)
Dept: RADIOLOGY | Age: 42
Discharge: HOME | End: 2022-11-26
Attending: PSYCHIATRY & NEUROLOGY
Payer: COMMERCIAL

## 2022-11-26 DIAGNOSIS — R27.0 ATAXIA, UNSPECIFIED: ICD-10-CM

## 2022-11-26 DIAGNOSIS — H53.9 UNSPECIFIED VISUAL DISTURBANCE: ICD-10-CM

## 2022-11-26 DIAGNOSIS — H81.319 AURAL VERTIGO, UNSPECIFIED EAR: ICD-10-CM

## 2022-11-26 RX ORDER — GADOBUTROL 604.72 MG/ML
8.5 INJECTION INTRAVENOUS ONCE
Status: COMPLETED | OUTPATIENT
Start: 2022-11-26 | End: 2022-11-26

## 2022-11-26 RX ADMIN — GADOBUTROL 8.5 ML: 604.72 INJECTION INTRAVENOUS at 10:38

## 2023-02-09 ENCOUNTER — OFFICE VISIT (OUTPATIENT)
Dept: FAMILY MEDICINE | Facility: CLINIC | Age: 43
End: 2023-02-09
Payer: COMMERCIAL

## 2023-02-09 VITALS
BODY MASS INDEX: 23.23 KG/M2 | DIASTOLIC BLOOD PRESSURE: 73 MMHG | TEMPERATURE: 98.5 F | WEIGHT: 181 LBS | HEIGHT: 74 IN | SYSTOLIC BLOOD PRESSURE: 125 MMHG | RESPIRATION RATE: 14 BRPM | HEART RATE: 76 BPM | OXYGEN SATURATION: 98 %

## 2023-02-09 DIAGNOSIS — Z00.00 ENCOUNTER FOR GENERAL ADULT MEDICAL EXAMINATION WITHOUT ABNORMAL FINDINGS: Primary | ICD-10-CM

## 2023-02-09 PROBLEM — H20.9 ANTERIOR UVEITIS: Status: ACTIVE | Noted: 2023-02-09

## 2023-02-09 PROBLEM — Z15.89 HLA B27 (HLA B27 POSITIVE): Status: ACTIVE | Noted: 2023-02-09

## 2023-02-09 PROCEDURE — 90715 TDAP VACCINE 7 YRS/> IM: CPT | Performed by: FAMILY MEDICINE

## 2023-02-09 PROCEDURE — 99386 PREV VISIT NEW AGE 40-64: CPT | Mod: 25 | Performed by: FAMILY MEDICINE

## 2023-02-09 PROCEDURE — 90471 IMMUNIZATION ADMIN: CPT | Performed by: FAMILY MEDICINE

## 2023-02-09 PROCEDURE — 3008F BODY MASS INDEX DOCD: CPT | Performed by: FAMILY MEDICINE

## 2023-02-09 ASSESSMENT — ENCOUNTER SYMPTOMS
SORE THROAT: 0
FEVER: 0
SHORTNESS OF BREATH: 0
DIARRHEA: 0
ARTHRALGIAS: 0
DYSURIA: 0
RHINORRHEA: 0
WEAKNESS: 0
PALPITATIONS: 0
COUGH: 0
BACK PAIN: 0
NUMBNESS: 0
ABDOMINAL PAIN: 0
FREQUENCY: 0
DIZZINESS: 0
VOMITING: 0
NAUSEA: 0
CONSTIPATION: 0
BLOOD IN STOOL: 0
CHILLS: 0
ADENOPATHY: 0

## 2023-02-09 NOTE — PATIENT INSTRUCTIONS
Diet - Try to limit portion sizes, take out, fast food, processed foods. Alcohol can be a expensive source of calories! If these are current problems for you, pick one area and focus on that first! There is no such thing as a perfect diet. If you are trying to lose weight, it will be difficult to do with foods you do not enjoy.  You may need to try a few different things before finding something.  In general, the diet with the best evidence is the Mediterranean diet. If you have high blood pressure, the DASH diet has good evidence to lower weight and BP.    Exercise - Goal should be 30-40 minutes, 3-4 times a week. If you are currently not exercising, set reachable goals with short term deadlines! The same goes with diet - you are less likely to be successful if you are doing something you don't enjoy.  Weights - even 1-2 pounds! - are great to strengthen bones in old adults.      Preventative Care Due - Colonoscopy - [phone]    Labs Due Today - Yes    Shots Due Today - TDap    COVID Vaccine - Omicron booster available for all adults.  This vaccine can be given 2 months after most recent COVID booster. Schedule at www.vaccines.gov    Follow up - 1 year or sooner if anything comes up. We keep same-day sick appointments available every day for last minute problems.  If it is during the week - call us! Often times we can prevent an ER or UC visit! For certain problems, a telemedicine visit can be a great way to see me without having to come into the office or go to an UC!    If you have any specialists such as a Cardiologist, Gastroenterologist for a chronic problem, make sure you are following up with them as recommended!  Diabetics should be having a yearly eye exam by a Optometrist/Ophthalmologist and a foot exam by a Podiatrist!  Women should see their GYN yearly - though you may not need a pap smear done at each visit.  Those with a family history of skin cancer should see a Dermatologist annually.

## 2023-02-09 NOTE — ASSESSMENT & PLAN NOTE
Adult Male  Complaints - Resolved vertigo  Diet - Healthy  Activity - None  Tobacco Use - None  EtOH Use - 1-2/wk  Drug Use - None  Sexual Activity -   PMH - Anterior Uveits  FHX - M HTN DM; D TIA  Labs Ordered - CBC CMP LP HIV Hep C  Immunizations Recommended - TDap  Preventative Care Recommended - Colonoscopy  Refused - None  Follow up - 1 y

## 2023-02-09 NOTE — PROGRESS NOTES
[address] [address]  [place] [address]  [phone]       Reason for visit:   Chief Complaint   Patient presents with   • Establish Care     Pt is here to establish care       HPI   Haris Mcintosh is a 42 y.o. male who presents for his CPX   Medical Updates Yes has seen ENT and Neuro for vertigo symptoms. Has resolved. Imaging benign.    Personal Updates No     Diet - Healthy  Exercise - None    Smoke No   Alcohol Yes 1-2/wk  Drugs No     Lives with Wife, 3 kids  Work     Hep A Due No   HPV Due No   TDAP Due Yes   Flu Due No   COVID Vaccination Due No   Lipids Due Yes   STDs Due No   Colonoscopy Due Yes Due to genetic mutation    Specialists: ENT, Neurology   Past Medical History:   Diagnosis Date   • Anterior uveitis    • HLA B27 (HLA B27 positive)      Past Surgical History:   Procedure Laterality Date   • NASAL FRACTURE SURGERY     • UPPER GASTROINTESTINAL ENDOSCOPY  10/2020    clear per patient, due to GERD     Social History     Socioeconomic History   • Marital status:      Spouse name: Not on file   • Number of children: Not on file   • Years of education: Not on file   • Highest education level: Not on file   Occupational History   • Not on file   Tobacco Use   • Smoking status: Never   • Smokeless tobacco: Never   Substance and Sexual Activity   • Alcohol use: Not Currently     Comment: 1-2/wk   • Drug use: Not Currently   • Sexual activity: Yes     Partners: Female   Other Topics Concern   • Not on file   Social History Narrative   • Not on file     Social Determinants of Health     Financial Resource Strain: Not on file   Food Insecurity: Not on file   Transportation Needs: Not on file   Physical Activity: Not on file   Stress: Not on file   Social Connections: Not on file   Intimate Partner Violence: Not on file   Housing Stability: Not on file     Family History   Problem Relation Age of Onset   • Hypertension Biological  "Mother    • Diabetes Biological Mother    • Stroke Biological Father         TIA     Patient has no known allergies.  No current outpatient medications on file.     No current facility-administered medications for this visit.       Review of Systems   Constitutional: Negative for chills and fever.   HENT: Negative for congestion, hearing loss, rhinorrhea and sore throat.    Eyes: Negative for visual disturbance.   Respiratory: Negative for cough and shortness of breath.    Cardiovascular: Negative for chest pain and palpitations.   Gastrointestinal: Negative for abdominal pain, blood in stool, constipation, diarrhea, nausea and vomiting.   Genitourinary: Negative for dysuria, frequency and testicular pain.   Musculoskeletal: Negative for arthralgias and back pain.   Skin: Negative for rash.   Allergic/Immunologic: Negative for environmental allergies and food allergies.   Neurological: Negative for dizziness, weakness and numbness.   Hematological: Negative for adenopathy.     Objective   Vitals:    02/09/23 0856   BP: 125/73   BP Location: Left upper arm   Patient Position: Sitting   Pulse: 76   Resp: 14   Temp: 36.9 °C (98.5 °F)   SpO2: 98%   Weight: 82.1 kg (181 lb)   Height: 1.88 m (6' 2\")       Physical Exam  Vitals and nursing note reviewed.   Constitutional:       Appearance: Normal appearance. He is well-developed and well-nourished.   HENT:      Head: Normocephalic and atraumatic.      Right Ear: Tympanic membrane and ear canal normal.      Left Ear: Tympanic membrane and ear canal normal.      Nose: Nose normal.      Mouth/Throat:      Mouth: Oropharynx is clear and moist and mucous membranes are normal.   Eyes:      Conjunctiva/sclera: Conjunctivae normal.      Pupils: Pupils are equal, round, and reactive to light.   Neck:      Thyroid: No thyroid mass or thyromegaly.   Cardiovascular:      Rate and Rhythm: Normal rate and regular rhythm.      Pulses: Normal pulses.           Radial pulses are 2+ on the " right side and 2+ on the left side.      Heart sounds: S1 normal and S2 normal. No murmur heard.    No friction rub. No gallop.   Pulmonary:      Effort: Pulmonary effort is normal.      Breath sounds: Normal breath sounds. No wheezing, rhonchi or rales.   Abdominal:      General: Bowel sounds are normal.      Palpations: Abdomen is soft.      Tenderness: There is no abdominal tenderness. There is no guarding or rebound.   Musculoskeletal:         General: Normal range of motion.   Lymphadenopathy:      Cervical: No cervical adenopathy.   Neurological:      Mental Status: He is alert and oriented to person, place, and time.      Cranial Nerves: No cranial nerve deficit.      Motor: Motor strength is normal.   Psychiatric:         Mood and Affect: Mood and affect normal.         Speech: Speech normal.         Behavior: Behavior normal.         Cognition and Memory: Cognition and memory normal.         Judgment: Judgment normal.         Procedures    Lab Results   Component Value Date    WBC 5.71 03/07/2016    HGB 14.8 03/07/2016    HCT 41.2 03/07/2016     03/07/2016     03/07/2016    K 3.8 03/07/2016     03/07/2016    CREATININE 0.9 03/07/2016    BUN 16 03/07/2016    CO2 29 03/07/2016         Assessment   Problem List Items Addressed This Visit        Other    Encounter for general adult medical examination without abnormal findings - Primary     Adult Male  Complaints - Resolved vertigo  Diet - Healthy  Activity - None  Tobacco Use - None  EtOH Use - 1-2/wk  Drug Use - None  Sexual Activity -   PMH - Anterior Uveits  FHX - M HTN DM; D TIA  Labs Ordered - CBC CMP LP HIV Hep C  Immunizations Recommended - TDap  Preventative Care Recommended - Colonoscopy  Refused - None  Follow up - 1 y          Relevant Orders    CBC and Differential    Comprehensive metabolic panel    Hepatitis C antibody    HIV 1,2 AB P24 AG    Lipid panel    Tdap vaccine greater than or equal to 8yo IM (Completed)            Herb Stevens MD  2/9/2023

## 2023-02-10 LAB
ALBUMIN SERPL-MCNC: 4.8 G/DL (ref 4–5)
ALBUMIN/GLOB SERPL: 1.8 {RATIO} (ref 1.2–2.2)
ALP SERPL-CCNC: 67 IU/L (ref 44–121)
ALT SERPL-CCNC: 14 IU/L (ref 0–44)
AST SERPL-CCNC: 19 IU/L (ref 0–40)
BASOPHILS # BLD AUTO: 0 X10E3/UL (ref 0–0.2)
BASOPHILS NFR BLD AUTO: 1 %
BILIRUB SERPL-MCNC: 0.9 MG/DL (ref 0–1.2)
BUN SERPL-MCNC: 17 MG/DL (ref 6–24)
BUN/CREAT SERPL: 18 (ref 9–20)
CALCIUM SERPL-MCNC: 9.8 MG/DL (ref 8.7–10.2)
CHLORIDE SERPL-SCNC: 102 MMOL/L (ref 96–106)
CHOLEST SERPL-MCNC: 164 MG/DL (ref 100–199)
CO2 SERPL-SCNC: 26 MMOL/L (ref 20–29)
CREAT SERPL-MCNC: 0.92 MG/DL (ref 0.76–1.27)
EGFRCR SERPLBLD CKD-EPI 2021: 107 ML/MIN/1.73
EOSINOPHIL # BLD AUTO: 0 X10E3/UL (ref 0–0.4)
EOSINOPHIL NFR BLD AUTO: 0 %
ERYTHROCYTE [DISTWIDTH] IN BLOOD BY AUTOMATED COUNT: 12.5 % (ref 11.6–15.4)
GLOBULIN SER CALC-MCNC: 2.6 G/DL (ref 1.5–4.5)
GLUCOSE SERPL-MCNC: 77 MG/DL (ref 70–99)
HCT VFR BLD AUTO: 42.9 % (ref 37.5–51)
HCV AB S/CO SERPL IA: <0.1 S/CO RATIO (ref 0–0.9)
HDLC SERPL-MCNC: 45 MG/DL
HGB BLD-MCNC: 14.9 G/DL (ref 13–17.7)
HIV 1+2 AB+HIV1 P24 AG SERPL QL IA: NON REACTIVE
IMM GRANULOCYTES # BLD AUTO: 0 X10E3/UL (ref 0–0.1)
IMM GRANULOCYTES NFR BLD AUTO: 0 %
LDLC SERPL CALC-MCNC: 104 MG/DL (ref 0–99)
LYMPHOCYTES # BLD AUTO: 1.7 X10E3/UL (ref 0.7–3.1)
LYMPHOCYTES NFR BLD AUTO: 39 %
MCH RBC QN AUTO: 30.5 PG (ref 26.6–33)
MCHC RBC AUTO-ENTMCNC: 34.7 G/DL (ref 31.5–35.7)
MCV RBC AUTO: 88 FL (ref 79–97)
MONOCYTES # BLD AUTO: 0.3 X10E3/UL (ref 0.1–0.9)
MONOCYTES NFR BLD AUTO: 7 %
NEUTROPHILS # BLD AUTO: 2.3 X10E3/UL (ref 1.4–7)
NEUTROPHILS NFR BLD AUTO: 53 %
PLATELET # BLD AUTO: 186 X10E3/UL (ref 150–450)
POTASSIUM SERPL-SCNC: 4.3 MMOL/L (ref 3.5–5.2)
PROT SERPL-MCNC: 7.4 G/DL (ref 6–8.5)
RBC # BLD AUTO: 4.89 X10E6/UL (ref 4.14–5.8)
SODIUM SERPL-SCNC: 142 MMOL/L (ref 134–144)
TRIGL SERPL-MCNC: 80 MG/DL (ref 0–149)
VLDLC SERPL CALC-MCNC: 15 MG/DL (ref 5–40)
WBC # BLD AUTO: 4.3 X10E3/UL (ref 3.4–10.8)

## 2024-03-18 ENCOUNTER — TELEPHONE (OUTPATIENT)
Dept: FAMILY MEDICINE | Facility: CLINIC | Age: 44
End: 2024-03-18
Payer: COMMERCIAL

## 2024-04-26 ENCOUNTER — TELEPHONE (OUTPATIENT)
Dept: FAMILY MEDICINE | Facility: CLINIC | Age: 44
End: 2024-04-26
Payer: COMMERCIAL

## 2024-05-03 ENCOUNTER — OFFICE VISIT (OUTPATIENT)
Dept: FAMILY MEDICINE | Facility: CLINIC | Age: 44
End: 2024-05-03
Payer: COMMERCIAL

## 2024-05-03 VITALS
DIASTOLIC BLOOD PRESSURE: 79 MMHG | HEART RATE: 85 BPM | TEMPERATURE: 98 F | WEIGHT: 183 LBS | BODY MASS INDEX: 23.49 KG/M2 | HEIGHT: 74 IN | OXYGEN SATURATION: 99 % | SYSTOLIC BLOOD PRESSURE: 117 MMHG

## 2024-05-03 DIAGNOSIS — L40.50 PSORIATIC ARTHRITIS (CMS/HCC): ICD-10-CM

## 2024-05-03 DIAGNOSIS — Z00.00 ENCOUNTER FOR GENERAL ADULT MEDICAL EXAMINATION WITHOUT ABNORMAL FINDINGS: Primary | ICD-10-CM

## 2024-05-03 PROBLEM — K21.9 GASTRO-ESOPHAGEAL REFLUX DISEASE WITHOUT ESOPHAGITIS: Status: ACTIVE | Noted: 2020-09-01

## 2024-05-03 PROCEDURE — 99396 PREV VISIT EST AGE 40-64: CPT | Performed by: FAMILY MEDICINE

## 2024-05-03 PROCEDURE — 3008F BODY MASS INDEX DOCD: CPT | Performed by: FAMILY MEDICINE

## 2024-05-03 RX ORDER — FOLIC ACID 1 MG/1
TABLET ORAL
COMMUNITY
Start: 2024-02-19 | End: 2024-11-15

## 2024-05-03 RX ORDER — OMEPRAZOLE 20 MG/1
CAPSULE, DELAYED RELEASE ORAL
COMMUNITY
Start: 2023-12-07

## 2024-05-03 RX ORDER — IBUPROFEN 200 MG
TABLET ORAL
COMMUNITY
Start: 2023-12-07

## 2024-05-03 RX ORDER — METHOTREXATE 2.5 MG/1
TABLET ORAL
COMMUNITY
Start: 2024-03-05

## 2024-05-03 ASSESSMENT — ENCOUNTER SYMPTOMS
CHILLS: 0
CONSTIPATION: 0
DYSURIA: 0
FREQUENCY: 0
SHORTNESS OF BREATH: 0
ABDOMINAL PAIN: 0
BACK PAIN: 0
SORE THROAT: 0
DIZZINESS: 0
WEAKNESS: 0
RHINORRHEA: 0
VOMITING: 0
NAUSEA: 0
BLOOD IN STOOL: 0
PALPITATIONS: 0
ADENOPATHY: 0
NUMBNESS: 0
ARTHRALGIAS: 0
COUGH: 0
FEVER: 0
DIARRHEA: 0

## 2024-05-03 ASSESSMENT — PATIENT HEALTH QUESTIONNAIRE - PHQ9: SUM OF ALL RESPONSES TO PHQ9 QUESTIONS 1 & 2: 0

## 2024-05-03 NOTE — ASSESSMENT & PLAN NOTE
Adult Male  Complaints - Dx with Psoriatic Arthritis by Rheum. On MTX and doing well.   Diet - Healthy  Activity - Inconsistent  Tobacco Use - None  EtOH Use - 0-2/wk  Drug Use - None  Sexual Activity -   PMH - Anterior Uveits, Psoriatric Arthritis, GERD  FHX - M HTN DM; D TIA  Labs Ordered - LP  Immunizations Recommended - None  Preventative Care Recommended - None  Refused - None  Follow up - 1 y

## 2024-05-03 NOTE — PROGRESS NOTES
[place] [id number]  [phone]       Reason for visit:   Chief Complaint   Patient presents with    Annual Exam      HPI   Haris Mcintosh is a 43 y.o. male who presents for his CPX   Medical Updates Yes Rheum Dx with Psoriatic Arthritis. Tolerating MTX well and improving. Had CBC CMP done last week through Rheum. Reviewed results - normal.    Personal Updates No     Diet - Healthy  Exercise - Inconsistent    Smoke No   Alcohol Yes 0-2/wk  Drugs No     Lives with Wife, 3 kids  Work Product Management    Hep A Due No   HPV Due No   TDAP Due No   Flu Due No   COVID Vaccination Due No   Lipids Due Yes   STDs Due No   Colonoscopy Due No     Specialists: Rheumatology   Past Medical History:   Diagnosis Date    Anterior uveitis     HLA B27 (HLA B27 positive)     Psoriatic arthritis (CMS/[facility]) 05/03/2024     Past Surgical History:   Procedure Laterality Date    NASAL FRACTURE SURGERY      UPPER GASTROINTESTINAL ENDOSCOPY  10/2020    clear per patient, due to GERD     Social History     Socioeconomic History    Marital status:      Spouse name: Not on file    Number of children: Not on file    Years of education: Not on file    Highest education level: Not on file   Occupational History    Not on file   Tobacco Use    Smoking status: Never    Smokeless tobacco: Never   Substance and Sexual Activity    Alcohol use: Yes     Comment: 0-2/wk    Drug use: Not Currently    Sexual activity: Yes     Partners: Female   Other Topics Concern    Not on file   Social History Narrative    Do you wear your seatbelt? yes    Do you have smoke detector in your home? yes    Do you have a carbon monoxide detector in your home? 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         Social Determinants of Health     Financial Resource Strain: Not on file   Food Insecurity: Not on file   Transportation Needs: Not on file   Physical  Activity: Not on file   Stress: Not on file   Social Connections: Not on file   Intimate Partner Violence: Not on file   Housing Stability: Not on file     Family History   Problem Relation Age of Onset    Hypertension Biological Mother     Diabetes Biological Mother     Stroke Biological Father         TIA     Patient has no known allergies.  Current Outpatient Medications   Medication Sig Dispense Refill    folic acid (FOLVITE) 1 mg tablet Folic Acid 1 MG Oral Tablet QTY: 90 tablet Days: 90 Refills: 2  Written: 02/19/24 Patient Instructions: TAKE 1 TABLET BY MOUTH EVERY DAY      ibuprofen (AdviL) 200 mg tablet Advil 200 MG Oral Tablet QTY: 0 tablet Days: 0 Refills: 0  Written: 12/07/23 Patient Instructions: prn      methotrexate (TREXALL) 2.5 mg tablet Methotrexate Sodium 2.5 MG Oral Tablet QTY: 72 tablet Days: 84 Refills: 2  Written: 03/05/24 Patient Instructions: TAKE 6 PILLS BY MOUTH WEEKLY      omeprazole (PriLOSEC) 20 mg capsule [facility] Omeprazole 20 MG Oral Tablet Delayed Release QTY: 0 tablet Days: 0 Refills: 0  Written: 12/07/23 Patient Instructions: prn       No current facility-administered medications for this visit.       Review of Systems   Constitutional:  Negative for chills and fever.   HENT:  Negative for congestion, hearing loss, rhinorrhea and sore throat.    Eyes:  Negative for visual disturbance.   Respiratory:  Negative for cough and shortness of breath.    Cardiovascular:  Negative for chest pain and palpitations.   Gastrointestinal:  Negative for abdominal pain, blood in stool, constipation, diarrhea, nausea and vomiting.   Genitourinary:  Negative for dysuria, frequency and testicular pain.   Musculoskeletal:  Negative for arthralgias and back pain.   Skin:  Positive for rash (psoriatic rashes).   Allergic/Immunologic: Negative for environmental allergies and food allergies.   Neurological:  Negative for dizziness, weakness and numbness.   Hematological:  Negative for adenopathy.     Objective  "  Vitals:    05/03/24 0846   BP: 117/79   BP Location: Left upper arm   Patient Position: Sitting   Pulse: 85   Temp: 36.7 °C (98 °F)   TempSrc: Temporal   SpO2: 99%   Weight: 83 kg (183 lb)   Height: 1.88 m (6' 2\")       Physical Exam  Vitals and nursing note reviewed.   Constitutional:       Appearance: Normal appearance. He is well-developed.   HENT:      Head: Normocephalic and atraumatic.      Right Ear: Tympanic membrane and ear canal normal.      Left Ear: Tympanic membrane and ear canal normal.      Nose: Nose normal.   Eyes:      Conjunctiva/sclera: Conjunctivae normal.      Pupils: Pupils are equal, round, and reactive to light.   Neck:      Thyroid: No thyroid mass or thyromegaly.   Cardiovascular:      Rate and Rhythm: Normal rate and regular rhythm.      Pulses: Normal pulses.           Radial pulses are 2+ on the right side and 2+ on the left side.      Heart sounds: S1 normal and S2 normal. No murmur heard.     No friction rub. No gallop.   Pulmonary:      Effort: Pulmonary effort is normal.      Breath sounds: Normal breath sounds. No wheezing, rhonchi or rales.   Abdominal:      General: Bowel sounds are normal.      Palpations: Abdomen is soft.      Tenderness: There is no abdominal tenderness. There is no guarding or rebound.   Musculoskeletal:         General: Normal range of motion.      Right lower leg: No edema.      Left lower leg: No edema.   Lymphadenopathy:      Cervical: No cervical adenopathy.   Neurological:      Mental Status: He is alert and oriented to person, place, and time.      Cranial Nerves: No cranial nerve deficit.   Psychiatric:         Speech: Speech normal.         Behavior: Behavior normal.         Judgment: Judgment normal.         Procedures    Lab Results   Component Value Date    WBC 4.3 02/09/2023    HGB 14.9 02/09/2023    HCT 42.9 02/09/2023     02/09/2023    CHOL 164 02/09/2023    TRIG 80 02/09/2023    HDL 45 02/09/2023    ALT 14 02/09/2023    AST 19 " 02/09/2023     02/09/2023    K 4.3 02/09/2023     02/09/2023    CREATININE 0.92 02/09/2023    BUN 17 02/09/2023    CO2 26 02/09/2023         Assessment   Problem List Items Addressed This Visit       Encounter for general adult medical examination without abnormal findings - Primary     Adult Male  Complaints - Dx with Psoriatic Arthritis by Rheum. On MTX and doing well.   Diet - Healthy  Activity - Inconsistent  Tobacco Use - None  EtOH Use - 0-2/wk  Drug Use - None  Sexual Activity -   PMH - Anterior Uveits, Psoriatric Arthritis, GERD  FHX - M HTN DM; D TIA  Labs Ordered - LP  Immunizations Recommended - None  Preventative Care Recommended - None  Refused - None  Follow up - 1 y          Relevant Orders    Lipid panel    Psoriatic arthritis (CMS/HCC)           Herb Stevens MD  5/3/2024

## 2024-05-03 NOTE — PATIENT INSTRUCTIONS
My apologies if we were unable to draw your blood in the office today.  Our office had been immune to staffing shortages since I started working here 8 years ago.  Unfortunately, the last few months we have struggled due to unforeseen circumstances. We are working very hard to get back to full staff levels so we can get back to drawing your blood at visits. Thank you for your patience and understanding!    Diet - Try to limit portion sizes, take out, fast food, processed foods. Alcohol can be a expensive source of calories! If these are current problems for you, pick one area and focus on that first! There is no such thing as a perfect diet. If you are trying to lose weight, it will be difficult to do with foods you do not enjoy.  You may need to try a few different things before finding something.  In general, the diet with the best evidence is the Mediterranean diet. If you have high blood pressure, the DASH diet has good evidence to lower weight and BP. If you have Diabetes or Pre-Diabetes, limiting carbohydrate and sugar intake is essential.  Rice, breads, pasta, sodas are commonly forgotten about that could improve your numbers if you limit your intake.    Exercise - Goal should be 30-40 minutes, 3-4 times a week. If you are currently not exercising, set reachable goals with short term deadlines!  While walking your dog is great it usually is not enough to lead to significant weight loss.  To achieve the weight loss, you need sustained elevated heart rates, preferably over 100 beats per minute.  Weights - even 1-2 pounds! - are great to strengthen bones in older adults.      Preventative Care Due - None    Labs Due Today - Yes    Shots Due Today - None    COVID Vaccine - New Booster was released in September 2023. 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 We keep same-day sick appointments available every day for last minute problems.  If it is during the week - call us! Often times we can prevent an ER or UC visit! For certain problems, a telemedicine visit can be a great way to see me without having to come into the office or go to an UC!    If you have a chronic medical problem such as Hypertension, Diabetes, Heart Disease or have multiple chronic problems, it is likely I want to see you every 6 months.    If you have any specialists such as a Cardiologist, Gastroenterologist for a chronic problem, make sure you are following up with them as recommended!  Diabetics should be having a yearly eye exam by a Optometrist/Ophthalmologist and a foot exam by a Podiatrist!  Women of reproductive age should see their GYN yearly - though you may not need a pap smear done at each visit.  Those with a family history of skin cancer should see a Dermatologist annually.

## 2024-05-15 LAB
CHOLEST SERPL-MCNC: 166 MG/DL (ref 100–199)
HDLC SERPL-MCNC: 43 MG/DL
LDLC SERPL CALC-MCNC: 107 MG/DL (ref 0–99)
TRIGL SERPL-MCNC: 86 MG/DL (ref 0–149)
VLDLC SERPL CALC-MCNC: 16 MG/DL (ref 5–40)

## 2025-08-18 ENCOUNTER — NURSE TRIAGE (OUTPATIENT)
Dept: FAMILY MEDICINE | Facility: CLINIC | Age: 45
End: 2025-08-18

## 2025-08-18 ENCOUNTER — OFFICE VISIT (OUTPATIENT)
Dept: FAMILY MEDICINE | Facility: CLINIC | Age: 45
End: 2025-08-18
Payer: COMMERCIAL

## 2025-08-18 VITALS
HEART RATE: 59 BPM | TEMPERATURE: 97.6 F | OXYGEN SATURATION: 99 % | SYSTOLIC BLOOD PRESSURE: 140 MMHG | DIASTOLIC BLOOD PRESSURE: 80 MMHG | HEIGHT: 74 IN | WEIGHT: 193 LBS | BODY MASS INDEX: 24.77 KG/M2

## 2025-08-18 DIAGNOSIS — L40.50 PSORIATIC ARTHRITIS (CMS/HCC): ICD-10-CM

## 2025-08-18 DIAGNOSIS — I49.3 VENTRICULAR ECTOPIC BEATS: ICD-10-CM

## 2025-08-18 DIAGNOSIS — R00.2 PALPITATIONS: Primary | ICD-10-CM

## 2025-08-18 PROCEDURE — 99213 OFFICE O/P EST LOW 20 MIN: CPT | Mod: 25 | Performed by: FAMILY MEDICINE

## 2025-08-18 PROCEDURE — 3008F BODY MASS INDEX DOCD: CPT | Performed by: FAMILY MEDICINE

## 2025-08-18 PROCEDURE — 93000 ELECTROCARDIOGRAM COMPLETE: CPT | Performed by: FAMILY MEDICINE

## 2025-08-18 RX ORDER — FAMOTIDINE 20 MG/1
20 TABLET, FILM COATED ORAL 2 TIMES DAILY
COMMUNITY

## 2025-08-18 ASSESSMENT — ENCOUNTER SYMPTOMS
ACTIVITY CHANGE: 0
PALPITATIONS: 1
LIGHT-HEADEDNESS: 0
SHORTNESS OF BREATH: 0
DIZZINESS: 0
APPETITE CHANGE: 0

## 2025-08-19 LAB
MAGNESIUM SERPL-MCNC: 1.9 MG/DL (ref 1.6–2.3)
T4 FREE SERPL-MCNC: 1.49 NG/DL (ref 0.82–1.77)
TSH SERPL DL<=0.005 MIU/L-ACNC: 1.51 UIU/ML (ref 0.45–4.5)

## 2025-08-29 ENCOUNTER — TELEPHONE (OUTPATIENT)
Dept: FAMILY MEDICINE | Facility: CLINIC | Age: 45
End: 2025-08-29
Payer: COMMERCIAL